# Patient Record
Sex: FEMALE | Race: WHITE
[De-identification: names, ages, dates, MRNs, and addresses within clinical notes are randomized per-mention and may not be internally consistent; named-entity substitution may affect disease eponyms.]

---

## 2020-12-01 ENCOUNTER — HOSPITAL ENCOUNTER (OUTPATIENT)
Dept: HOSPITAL 43 - DL.ENDO | Age: 50
Discharge: HOME | End: 2020-12-01
Attending: INTERNAL MEDICINE
Payer: COMMERCIAL

## 2020-12-01 VITALS — HEART RATE: 50 BPM | SYSTOLIC BLOOD PRESSURE: 116 MMHG | DIASTOLIC BLOOD PRESSURE: 56 MMHG

## 2020-12-01 DIAGNOSIS — F32.9: ICD-10-CM

## 2020-12-01 DIAGNOSIS — Z88.8: ICD-10-CM

## 2020-12-01 DIAGNOSIS — K31.7: Primary | ICD-10-CM

## 2020-12-01 DIAGNOSIS — G47.33: ICD-10-CM

## 2020-12-01 DIAGNOSIS — Z98.890: ICD-10-CM

## 2020-12-01 DIAGNOSIS — G43.909: ICD-10-CM

## 2020-12-01 DIAGNOSIS — F41.1: ICD-10-CM

## 2020-12-01 DIAGNOSIS — Z90.49: ICD-10-CM

## 2020-12-01 DIAGNOSIS — E03.9: ICD-10-CM

## 2020-12-01 DIAGNOSIS — E66.09: ICD-10-CM

## 2020-12-01 PROCEDURE — 87077 CULTURE AEROBIC IDENTIFY: CPT

## 2020-12-01 PROCEDURE — 43239 EGD BIOPSY SINGLE/MULTIPLE: CPT

## 2020-12-01 NOTE — OR
DATE:  12/01/2020

 

PROCEDURE:  Esophagogastroduodenoscopy and multiple pinch biopsies.

 

INSTRUMENT USED:  GIF- Olympus video panendoscope.

 

PREMEDICATIONS:  No oral or topical anesthesia used.  Fentanyl 100 mcg

intravenous, Versed 2 mg intravenous.  Nasal O2 cannula.

 

The procedure was done under pulse oximetry, BP recording, and cardiac monitor.

 

INDICATION:  The patient with longstanding heartburn, unexplained, and not

responsive to medical measures, on long term PPI.  Esophagogastroduodenoscopy is

performed for detection of any active erosive lesions, Barclay esophagus and/or

malignancy also under consideration, H pylori status to be determined,

endoscopic hemostasis therapy if needed.  The scope was passed with ease.

Adequate visualization of the esophagus was made from proximal to distal areas.

No upper esophageal lesions identified.  No distal esophageal stricture.  No

uphill or downhill esophageal varices.  No Kelly-Clinton tear.  No evidence of

erosive esophagitis by Luce criteria.  No esophageal polyp or tumor mass

identified.  The Z-line was seen at around 39 cm distal to the oral verge,

configuration consistent with grade 1 by ZAP classification.  No proximal

gastric varices noted.  Gastric fundus examination by retroflexion showed

multiple benign-appearing diminutive polyps.  No gastric ulcer, malignant mass,

or vascular ectasia identified.  Duodenal bulb showed no ulcer.  Visualized

second part of the duodenum was unremarkable.  Multiple pinch biopsies were

taken from the gastric antrum and proximal body and sent for PyloriTek test for

H pylori, and if negative in an hour, the tissues to be sent for histopathology.

No bleeding was noted from any of the visualized areas at the completion of

examination.  Photographs were taken of the duodenal bulb, gastric antrum,

fundus, and distal esophagus.

 

IMPRESSION:  Diminutive gastric fundus polyps.

 

The patient tolerated the procedure well.

 

DD:  12/01/2020 07:22:47

DT:  12/01/2020 07:38:04

[facility]

Job #:  [id number]

## 2020-12-03 ENCOUNTER — HOSPITAL ENCOUNTER (OUTPATIENT)
Dept: HOSPITAL 43 - DL.ENDO | Age: 50
Discharge: HOME | End: 2020-12-03
Attending: INTERNAL MEDICINE
Payer: COMMERCIAL

## 2020-12-03 VITALS — HEART RATE: 55 BPM | SYSTOLIC BLOOD PRESSURE: 119 MMHG | DIASTOLIC BLOOD PRESSURE: 78 MMHG

## 2020-12-03 DIAGNOSIS — K62.5: Primary | ICD-10-CM

## 2020-12-03 DIAGNOSIS — G43.909: ICD-10-CM

## 2020-12-03 DIAGNOSIS — F41.1: ICD-10-CM

## 2020-12-03 DIAGNOSIS — K64.4: ICD-10-CM

## 2020-12-03 DIAGNOSIS — Z98.890: ICD-10-CM

## 2020-12-03 DIAGNOSIS — Z88.8: ICD-10-CM

## 2020-12-03 DIAGNOSIS — G47.33: ICD-10-CM

## 2020-12-03 DIAGNOSIS — F32.9: ICD-10-CM

## 2020-12-03 DIAGNOSIS — E66.09: ICD-10-CM

## 2020-12-03 PROCEDURE — 45378 DIAGNOSTIC COLONOSCOPY: CPT

## 2020-12-03 NOTE — OR
DATE:  12/03/2020

 

PROCEDURE:  Total colonoscopy.

 

INSTRUMENT USED:  PCF-H190DL Olympus video colonoscope.

 

PREMEDICATIONS:  Fentanyl 100 mcg intravenous, Versed 4 mg intravenous, nasal O2

cannula.

 

The procedure was done under pulse oximetry, BP recording, and cardiac monitor.

 

INDICATION:  The patient with rectal bleeding.  Colonoscopic examination is done

for detection of any polypoid lesions and removal, endoscopic hemostasis therapy

if needed.

 

DESCRIPTION OF PROCEDURE:  Initial rectal exam showed external hemorrhoidal

tags.  Rigid anoscopy was normal.  The colonoscope was passed with ease into the

ileocecal area.  Photographs were taken of the normal-appearing cecum identified

by landmarks of appendiceal orifice and double-bulged ileocecal folds.  No

bleeding was noted from any of the visualized areas at the commencement of the

examination.  The bowel preparation was found to be adequate.  [place] scale II

in all the regions, total score 6.  No stricture.  No vascular ectasia.  No

large isolated ulcerations seen.  No evidence of diffuse inflammatory bowel

disease in the form of friability, contact bleeding, or ulcerations.  No polyp

or tumor mass identified.  Probing the proximal sides of folds and flexures

using adequate distention and clearing up the stool material, withdrawal of the

scope was made, cecum to rectum time over 6 minutes.  No bleeding was noted from

any visualized areas at the completion of examination.

 

IMPRESSION:  External hemorrhoids.  The patient tolerated the procedure well.

 

DD:  12/03/2020 08:02:27

DT:  12/03/2020 08:45:17

[facility]

Job #:  [id number]